# Patient Record
Sex: MALE | Race: WHITE | NOT HISPANIC OR LATINO | Employment: STUDENT | URBAN - METROPOLITAN AREA
[De-identification: names, ages, dates, MRNs, and addresses within clinical notes are randomized per-mention and may not be internally consistent; named-entity substitution may affect disease eponyms.]

---
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
## 2021-01-12 ENCOUNTER — OFFICE VISIT (OUTPATIENT)
Dept: URGENT CARE | Age: 21
End: 2021-01-12
Payer: COMMERCIAL

## 2021-01-12 VITALS — TEMPERATURE: 98.5 F | HEART RATE: 87 BPM | RESPIRATION RATE: 16 BRPM | OXYGEN SATURATION: 98 %

## 2021-01-12 DIAGNOSIS — J02.9 SORE THROAT: Primary | ICD-10-CM

## 2021-01-12 PROCEDURE — S9083 URGENT CARE CENTER GLOBAL: HCPCS | Performed by: PHYSICIAN ASSISTANT

## 2021-01-12 PROCEDURE — 87070 CULTURE OTHR SPECIMN AEROBIC: CPT | Performed by: PHYSICIAN ASSISTANT

## 2021-01-12 PROCEDURE — G0382 LEV 3 HOSP TYPE B ED VISIT: HCPCS | Performed by: PHYSICIAN ASSISTANT

## 2021-01-12 PROCEDURE — 87147 CULTURE TYPE IMMUNOLOGIC: CPT | Performed by: PHYSICIAN ASSISTANT

## 2021-01-13 NOTE — PROGRESS NOTES
[facility]        NAME: Tata Guillen is a 24 y o  male  : 2000    MRN: [id number]  DATE: 2021  TIME: 8:05 PM    Assessment and Plan   Sore throat [J02 9]  1  Sore throat           Patient Instructions       Follow up with PCP in 3-5 days  Proceed to  ER if symptoms worsen  Chief Complaint     Chief Complaint   Patient presents with    Sore Throat     sore throat and possible chills since this morning  History of Present Illness       Patient for evaluation of a sore throat  Patient does have history of strep and went to get checked  Denies any known fevers or chills, body aches, cough, shortness of breath  Review of Systems   Review of Systems   Constitutional: Negative  HENT: Positive for sore throat  Negative for congestion, ear discharge, ear pain, postnasal drip, rhinorrhea, sinus pressure, sinus pain and trouble swallowing  Eyes: Negative  Respiratory: Negative  Cardiovascular: Negative  Gastrointestinal: Negative  Neurological: Negative  Current Medications       Current Outpatient Medications:     ondansetron (ZOFRAN) 4 mg tablet, Take 1 tablet (4 mg total) by mouth every 6 (six) hours (Patient not taking: Reported on 2020), Disp: 12 tablet, Rfl: 0    Current Allergies     Allergies as of 2021    (No Known Allergies)            The following portions of the patient's history were reviewed and updated as appropriate: allergies, current medications, past family history, past medical history, past social history, past surgical history and problem list      Past Medical History:   Diagnosis Date    Viral meningitis        Past Surgical History:   Procedure Laterality Date    EXTERNAL EAR SURGERY      To unattach ear lobes bilaterally       Family History   Problem Relation Age of Onset    Heart disease Maternal Grandfather     Heart attack Maternal Grandfather          Medications have been verified          Objective Pulse 87   Temp 98 5 °F (36 9 °C)   Resp 16   SpO2 98%   No LMP for male patient  Physical Exam     Physical Exam  Vitals signs and nursing note reviewed  Constitutional:       General: He is not in acute distress  Appearance: Normal appearance  He is well-developed  He is not ill-appearing, toxic-appearing or diaphoretic  HENT:      Head: Normocephalic and atraumatic  Mouth/Throat:      Mouth: Mucous membranes are moist       Pharynx: Posterior oropharyngeal erythema present  No pharyngeal swelling, oropharyngeal exudate or uvula swelling  Tonsils: No tonsillar exudate or tonsillar abscesses  0 on the right  0 on the left  Eyes:      Extraocular Movements: Extraocular movements intact  Conjunctiva/sclera: Conjunctivae normal       Pupils: Pupils are equal, round, and reactive to light  Pulmonary:      Effort: Pulmonary effort is normal  No respiratory distress  Breath sounds: Normal breath sounds  No stridor  No wheezing, rhonchi or rales  Skin:     General: Skin is warm and dry  Neurological:      General: No focal deficit present  Mental Status: He is alert and oriented to person, place, and time  Psychiatric:         Mood and Affect: Mood normal          Behavior: Behavior normal          Thought Content:  Thought content normal          Judgment: Judgment normal

## 2021-01-14 ENCOUNTER — TELEMEDICINE (OUTPATIENT)
Dept: INTERNAL MEDICINE CLINIC | Facility: CLINIC | Age: 21
End: 2021-01-14
Payer: COMMERCIAL

## 2021-01-14 VITALS — WEIGHT: 170 LBS | HEIGHT: 73 IN | BODY MASS INDEX: 22.53 KG/M2 | TEMPERATURE: 98.1 F

## 2021-01-14 DIAGNOSIS — J02.9 SORE THROAT: ICD-10-CM

## 2021-01-14 DIAGNOSIS — Z86.16 HISTORY OF COVID-19: ICD-10-CM

## 2021-01-14 DIAGNOSIS — J02.9 SORE THROAT: Primary | ICD-10-CM

## 2021-01-14 PROCEDURE — 99203 OFFICE O/P NEW LOW 30 MIN: CPT | Performed by: INTERNAL MEDICINE

## 2021-01-14 PROCEDURE — 1036F TOBACCO NON-USER: CPT | Performed by: INTERNAL MEDICINE

## 2021-01-14 PROCEDURE — U0005 INFEC AGEN DETEC AMPLI PROBE: HCPCS | Performed by: INTERNAL MEDICINE

## 2021-01-14 PROCEDURE — 3008F BODY MASS INDEX DOCD: CPT | Performed by: INTERNAL MEDICINE

## 2021-01-14 PROCEDURE — U0003 INFECTIOUS AGENT DETECTION BY NUCLEIC ACID (DNA OR RNA); SEVERE ACUTE RESPIRATORY SYNDROME CORONAVIRUS 2 (SARS-COV-2) (CORONAVIRUS DISEASE [COVID-19]), AMPLIFIED PROBE TECHNIQUE, MAKING USE OF HIGH THROUGHPUT TECHNOLOGIES AS DESCRIBED BY CMS-2020-01-R: HCPCS | Performed by: INTERNAL MEDICINE

## 2021-01-14 RX ORDER — AMOXICILLIN 500 MG/1
500 CAPSULE ORAL EVERY 8 HOURS SCHEDULED
Qty: 30 CAPSULE | Refills: 0 | Status: SHIPPED | OUTPATIENT
Start: 2021-01-14 | End: 2021-01-24

## 2021-01-14 NOTE — PATIENT INSTRUCTIONS
differntial dx discussed    Will get repeat COVID test    Throat culture pending    Gargles with salt and water three times a day    Chlorospeptic as needed    Soft diet Advance as tolerated    Keep social distancing at least 6 feet, Drink plenty of fluid, Take temp  Twice a day or more frequently as needed  Use Mask to cover your nose and face when appropriate and possible  Take enough rest   If your breathing gets worse, or persistent high fever or if you get worse go to Emergency room  Ask questionsFollow with Consultants as per their and our suggestion    Follow up in 5  day(s) or as needed earlier    Follow all instructions as advised and discussed  Take your medications as prescribed  Call the office immediately if you experience any side effects  Ask questions if you do not understand  Keep your scheduled appointment as advised or come sooner if necessary or in doubt  Best time to call for non-urgent matter or questions on weekdays is between 9am and 12 noon  See physician for any new symptoms or worsening of current symptoms  Urgent or emergent situations call 911 and report to nearest emergency room  I spent  25 minutes taking care of this patient including clinical care, conseling, collaboration, chart, lab and consultaion review      Patient is to get labs today COVID 19 swab

## 2021-01-14 NOTE — ASSESSMENT & PLAN NOTE
differntial dx discussed    Will get repeat COVID test    Throat culture pending    Gargles with salt and water three times a day    Chlorospeptic as needed    Soft diet Advance as tolerated    Keep social distancing at least 6 feet, Drink plenty of fluid, Take temp  Twice a day or more frequently as needed  Use Mask to cover your nose and face when appropriate and possible  Take enough rest   If your breathing gets worse, or persistent high fever or if you get worse go to Emergency room  Ask questions

## 2021-01-14 NOTE — PROGRESS NOTES
Virtual Regular Visit      Assessment/Plan:sore throat  Hx of recurrent strep  Hx of COVID in 10/2020 recovered without residual  College student  Interested in [employer]  differntial dx discussed    Will get repeat COVID test    Throat culture pending    Gargles with salt and water three times a day    Chlorospeptic as needed    Soft diet Advance as tolerated    Keep social distancing at least 6 feet, Drink plenty of fluid, Take temp  Twice a day or more frequently as needed  Use Mask to cover your nose and face when appropriate and possible  Take enough rest   If your breathing gets worse, or persistent high fever or if you get worse go to Emergency room  Ask questions    Problem List Items Addressed This Visit     None               Reason for visit is   Chief Complaint   Patient presents with    Virtual Regular Visit        Encounter provider Anita Hernandez MD    Provider located at [address] [id number]      Recent Visits  No visits were found meeting these conditions  Showing recent visits within past 7 days and meeting all other requirements     Today's Visits  Date Type Provider Dept   01/14/21 Telemedicine Anita Hernandez MD [facility]   Showing today's visits and meeting all other requirements     Future Appointments  No visits were found meeting these conditions  Showing future appointments within next 150 days and meeting all other requirements        The patient was identified by name and date of birth  Cy Lat was informed that this is a telemedicine visit and that the visit is being conducted through [facility] and patient was informed that this is not a secure, HIPAA-compliant platform  He agrees to proceed     My office door was closed  No one else was in the room  He acknowledged consent and understanding of privacy and security of the video platform   The patient has agreed to participate and understands they can discontinue the visit at any time  Patient is aware this is a billable service  Subjective  Liat England is a 24 y o  male sore throat, interest in plasma donation   Pt c/o sore throat and congestion, pt went to urgent care and had strep screen which was negative  Pt started with sore throat Tuesday  Pt does have recurrent hx of strep throat, confirmed in the past Pt had culture done report pending  Pt c/fo head congestion, tightness in the fore head and runny nose and ear filled with water and feels like popping  No cough or SOB, Wheezing,    No problem with smell or taste, abdominal pain, nausea, or vomiting or diarrhea  No travel hx  No exposure hx for COVID or presumed COVID    Pt goes to college       Pt denies any headache or body ache  PMH negative for asthma or other major health issue    Non smoker, non major drinking           Past Medical History:   Diagnosis Date    GERD (gastroesophageal reflux disease)     Subarachnoid hemorrhage ([facility] [address] ) 3/18/2016    Traumatic brain injury ([facility] [address] ) 3/28/2016    Viral meningitis        Past Surgical History:   Procedure Laterality Date    EXTERNAL EAR SURGERY      To unattach ear lobes bilaterally       No current outpatient medications on file  No current facility-administered medications for this visit  No Known Allergies    Review of Systems   Constitutional: Negative for chills, fatigue, fever and unexpected weight change  HENT: Positive for congestion, ear pain, rhinorrhea, sinus pressure, sneezing and sore throat  Negative for dental problem, drooling, ear discharge, facial swelling, hearing loss, mouth sores, nosebleeds, postnasal drip, sinus pain, tinnitus and trouble swallowing  Eyes: Negative for photophobia, pain, redness and visual disturbance  Respiratory: Negative for cough, shortness of breath and wheezing      Cardiovascular: Negative for chest pain, palpitations and leg swelling  Gastrointestinal: Negative for abdominal pain, diarrhea and nausea  Endocrine: Negative for cold intolerance, polydipsia and polyuria  Genitourinary: Negative for dysuria, frequency and urgency  Musculoskeletal: Negative for arthralgias, gait problem and myalgias  Skin: Negative for rash  Allergic/Immunologic: Negative  Neurological: Negative for dizziness and headaches  Hematological: Negative for adenopathy  Psychiatric/Behavioral: Negative for behavioral problems  Video Exam    Vitals:    01/14/21 1038   Temp: 98 1 °F (36 7 °C)   Weight: 77 1 kg (170 lb)   Height: 6' 1" (1 854 m)       Physical Exam  Constitutional:       General: He is in acute distress  Appearance: He is not ill-appearing or diaphoretic  Pulmonary:      Breath sounds: No wheezing  Psychiatric:         Mood and Affect: Mood normal          Behavior: Behavior normal          Thought Content: Thought content normal          Judgment: Judgment normal           I spent 25 minutes directly with the patient during this visit      VIRTUAL VISIT DISCLAIMER    Liseth Pratt acknowledges that he has consented to an online visit or consultation  He understands that the online visit is based solely on information provided by him, and that, in the absence of a face-to-face physical evaluation by the physician, the diagnosis he receives is both limited and provisional in terms of accuracy and completeness  This is not intended to replace a full medical face-to-face evaluation by the physician  Liseth Pratt understands and accepts these terms

## 2021-01-14 NOTE — ASSESSMENT & PLAN NOTE
Dx in Oct 2020: presented with loss of taste, smell, fatigue, cough, SOB, fatigue, was treated as a outpatient  Pt got better in 8-10 got better  No residual or late effects  [facility]  Pt gave letter from [facility] about probable or confirmed COVID    Recovered till now just got sore throat     See sore throat

## 2021-01-15 LAB
BACTERIA THROAT CULT: ABNORMAL
SARS-COV-2 RNA SPEC QL NAA+PROBE: NOT DETECTED

## 2021-04-13 DIAGNOSIS — Z23 ENCOUNTER FOR IMMUNIZATION: ICD-10-CM

## 2021-05-04 ENCOUNTER — OFFICE VISIT (OUTPATIENT)
Dept: INTERNAL MEDICINE CLINIC | Facility: CLINIC | Age: 21
End: 2021-05-04
Payer: COMMERCIAL

## 2021-05-04 VITALS
WEIGHT: 168 LBS | HEIGHT: 73 IN | HEART RATE: 69 BPM | TEMPERATURE: 98.3 F | BODY MASS INDEX: 22.26 KG/M2 | OXYGEN SATURATION: 96 % | DIASTOLIC BLOOD PRESSURE: 70 MMHG | SYSTOLIC BLOOD PRESSURE: 120 MMHG

## 2021-05-04 DIAGNOSIS — J02.9 PHARYNGITIS, UNSPECIFIED ETIOLOGY: ICD-10-CM

## 2021-05-04 DIAGNOSIS — B37.0 THRUSH: Primary | ICD-10-CM

## 2021-05-04 PROCEDURE — 3725F SCREEN DEPRESSION PERFORMED: CPT | Performed by: INTERNAL MEDICINE

## 2021-05-04 PROCEDURE — 99213 OFFICE O/P EST LOW 20 MIN: CPT | Performed by: INTERNAL MEDICINE

## 2021-05-04 RX ORDER — AMOXICILLIN 500 MG/1
500 CAPSULE ORAL EVERY 8 HOURS SCHEDULED
Qty: 21 CAPSULE | Refills: 0 | Status: SHIPPED | OUTPATIENT
Start: 2021-05-04 | End: 2021-05-11

## 2021-05-04 NOTE — PROGRESS NOTES
[facility] Office Visit Note  21     Adam Alfredo 22 y o  male MRN: [id number]  : 2000    Assessment:     Pharyngitis  Mild acute pharyngitis with some thrush on the tongue  Recommend amoxicillin 500 mg t i d  For 1 week  Recommend nystatin swish and spit 5 mL 3 times a day followed by a warm water and gargles and spit  Six multivitamin for 1 month  If no better come back in 7-10 days  Thrush  Mild acute pharyngitis with some thrush on the tongue  Recommend amoxicillin 500 mg t i d  For 1 week  Recommend nystatin swish and spit 5 mL 3 times a day followed by a warm water and gargles and spit  Six multivitamin for 1 month  If no better come back in 7-10 days  There are no diagnoses linked to this encounter  Discussion Summary and Plan: Today's care plan and medications were reviewed with patient in detail and all their questions answered to their satisfaction  Chief Complaint   Patient presents with    Sore Throat     Red spots and bad taste, back of throat      Subjective:  Chief complaint:  Patient noticed soreness in the back of the throat as well as some white spot on the tongue as well as some red spots of symptoms are there for last 1 month  Patient denies any fever chills denies any swollen gland denies any nausea vomiting diarrhea abdominal pain denies any cough chest congestion denies any stuffy nose runny no sinus congestion denies any headache body ache  No exposure to COVID  No symptoms of the problem with smell or taste  The following portions of the patient's history were reviewed and updated as appropriate: allergies, current medications, past family history, past medical history, past social history, past surgical history and problem list     Review of Systems   Constitutional: Negative for chills, fatigue, fever and unexpected weight change     HENT: Negative for congestion, dental problem, drooling, ear discharge, ear pain, facial swelling, hearing loss, mouth sores, nosebleeds, postnasal drip, rhinorrhea, sinus pressure, sinus pain, sneezing, sore throat, tinnitus, trouble swallowing and voice change  Eyes: Negative for photophobia, pain, discharge, redness, itching and visual disturbance  Respiratory: Negative for cough, shortness of breath, wheezing and stridor  Cardiovascular: Negative for chest pain, palpitations and leg swelling  Gastrointestinal: Negative for abdominal pain, diarrhea and nausea  Endocrine: Negative for cold intolerance, polydipsia and polyuria  Genitourinary: Negative for dysuria, frequency and urgency  Musculoskeletal: Negative for arthralgias, gait problem and myalgias  Skin: Negative for rash  Allergic/Immunologic: Negative  Neurological: Negative for dizziness, seizures, light-headedness, numbness and headaches  Hematological: Negative for adenopathy  Psychiatric/Behavioral: Negative for behavioral problems           Historical Information   Patient Active Problem List   Diagnosis    Subarachnoid hemorrhage (HCC)    [facility] ([email or web address] )    GERD (gastroesophageal reflux disease)    History of COVID-19    Sore throat    Pharyngitis    Thrush     Past Medical History:   Diagnosis Date    GERD (gastroesophageal reflux disease)     Subarachnoid hemorrhage ([place] [address] ) 3/18/2016    Traumatic brain injury ([place] [address] ) 3/28/2016    Viral meningitis      Past Surgical History:   Procedure Laterality Date    EXTERNAL EAR SURGERY      To unattach ear lobes bilaterally     Social History     Substance and Sexual Activity   Alcohol Use Yes    Frequency: 2-4 times a month     Social History     Substance and Sexual Activity   Drug Use No     Social History     Tobacco Use   Smoking Status Never Smoker   Smokeless Tobacco Never Used     Family History   Problem Relation Age of Onset    Heart disease Maternal Grandfather     Heart attack Maternal Grandfather      Health Maintenance Due Topic    DTaP,Tdap,and Td Vaccines (1 - Tdap)    HPV Vaccine (1 - Male 2-dose series)    Depression Screening PHQ     COVID-19 Vaccine (1)    Annual Physical       Meds/Allergies     No current outpatient medications on file  Objective:    Vitals:   /70   Pulse 69   Temp 98 3 °F (36 8 °C)   Ht 6' 1" (1 854 m)   Wt 76 2 kg (168 lb)   SpO2 96%   BMI 22 16 kg/m²   Body mass index is 22 16 kg/m²  Vitals:    05/04/21 1606   Weight: 76 2 kg (168 lb)       Physical Exam  Vitals signs and nursing note reviewed  Constitutional:       Appearance: He is well-developed  HENT:      Head: Normocephalic and atraumatic  Hair is normal       Jaw: There is normal jaw occlusion  Salivary Glands: Right salivary gland is not diffusely enlarged or tender  Left salivary gland is not diffusely enlarged or tender  Mouth/Throat:      Lips: Pink  Mouth: Mucous membranes are moist  No oral lesions or angioedema  Tongue: No lesions  Tongue does not deviate from midline  Pharynx: Posterior oropharyngeal erythema present  No pharyngeal swelling, oropharyngeal exudate or uvula swelling  Tonsils: No tonsillar exudate or tonsillar abscesses  Comments: Mild edema is present in the back of the throat  Mild white is coating in the 2nd posterior half of the tongue is present  Eyes:      Conjunctiva/sclera: Conjunctivae normal    Neck:      Thyroid: No thyromegaly  Vascular: No JVD  Cardiovascular:      Rate and Rhythm: Regular rhythm  Heart sounds: Normal heart sounds  Pulmonary:      Effort: No respiratory distress  Breath sounds: Normal breath sounds  No wheezing or rales  Abdominal:      General: Bowel sounds are normal  There is no distension  Palpations: There is no mass  Tenderness: There is no abdominal tenderness  There is no rebound  Lymphadenopathy:      Cervical: No cervical adenopathy  Skin:     General: Skin is warm  Findings: No rash  Lab Review   No visits with results within 2 Month(s) from this visit  Latest known visit with results is:   Orders Only on 01/14/2021   Component Date Value Ref Range Status    SARS-CoV-2  01/14/2021 Not Detected  Not Detected Final    Comment: This nucleic acid amplification test was developed and its performance  characteristics determined by [facility]  Nucleic acid  amplification tests include PCR and TMA  This test has not been FDA  cleared or approved  This test has been authorized by FDA under an  Emergency Use Authorization (EUA)  This test is only authorized for  the duration of time the declaration that circumstances exist  justifying the authorization of the emergency use of in vitro  diagnostic tests for detection of SARS-CoV-2 virus and/or diagnosis  of COVID-19 infection under section 564(b)(1) of the Act, 21 U  S C   149BVT-9(M) (1), unless the authorization is terminated or revoked  sooner  When diagnostic testing is negative, the possibility of a false  negative result should be considered in the context of a patient's  recent exposures and the presence of clinical signs and symptoms  consistent with COVID-19  An individual without symptoms of COVID-19  and who is not shedding SARS-CoV-2 virus would                            expect to have a  negative (not detected) result in this assay  Patient Instructions   Mild acute pharyngitis with some thrush on the tongue  Recommend amoxicillin 500 mg t i d  For 1 week  Recommend nystatin swish and spit 5 mL 3 times a day followed by a warm water and gargles and spit  Six multivitamin for 1 month  If no better come back in 7-10 days  Dr Araceli Rose MD  [facility]       "This note has been constructed using a voice recognition system  Therefore there may be syntax, spelling, and/or grammatical errors   Please call if you have any questions  "

## 2021-05-04 NOTE — PATIENT INSTRUCTIONS
Mild acute pharyngitis with some thrush on the tongue  Recommend amoxicillin 500 mg t i d  For 1 week  Recommend nystatin swish and spit 5 mL 3 times a day followed by a warm water and gargles and spit  Six multivitamin for 1 month  If no better come back in 7-10 days

## 2021-05-28 ENCOUNTER — TELEMEDICINE (OUTPATIENT)
Dept: INTERNAL MEDICINE CLINIC | Facility: CLINIC | Age: 21
End: 2021-05-28
Payer: COMMERCIAL

## 2021-05-28 VITALS — TEMPERATURE: 98.2 F | WEIGHT: 165 LBS | HEIGHT: 73 IN | BODY MASS INDEX: 21.87 KG/M2

## 2021-05-28 DIAGNOSIS — L23.7 ALLERGIC CONTACT DERMATITIS DUE TO PLANTS, EXCEPT FOOD: Primary | ICD-10-CM

## 2021-05-28 DIAGNOSIS — L23.7 POISON IVY: ICD-10-CM

## 2021-05-28 PROCEDURE — 1036F TOBACCO NON-USER: CPT | Performed by: INTERNAL MEDICINE

## 2021-05-28 PROCEDURE — 3008F BODY MASS INDEX DOCD: CPT | Performed by: INTERNAL MEDICINE

## 2021-05-28 PROCEDURE — 99213 OFFICE O/P EST LOW 20 MIN: CPT | Performed by: INTERNAL MEDICINE

## 2021-05-28 RX ORDER — METHYLPREDNISOLONE 4 MG/1
TABLET ORAL
Qty: 21 EACH | Refills: 0 | Status: SHIPPED | OUTPATIENT
Start: 2021-05-28 | End: 2022-08-01

## 2021-05-28 NOTE — PROGRESS NOTES
Virtual Regular Visit      Assessment/Plan:Contact dermatitis, posion exposure    Problem List Items Addressed This Visit        Musculoskeletal and Integument    Allergic contact dermatitis due to plants, except food - Primary     Medrol Dosepak as directed  Benadryl 25 mg  every 6 hour as needed for the itching  Do not apply any cream on the rash  If no better the call us Tuesday or any time  Relevant Medications    methylPREDNISolone 4 MG tablet therapy pack    Poison ivy    Relevant Medications    methylPREDNISolone 4 MG tablet therapy pack               Reason for visit is   Chief Complaint   Patient presents with    Dermatitis    Rash    Virtual Regular Visit        Encounter provider Tashia Willingham MD    Provider located at [address]      Recent Visits  Date Type Provider Dept   05/28/21 Telemedicine Tashia Willingham MD [address] [facility]   Showing recent visits within past 7 days and meeting all other requirements     Future Appointments  No visits were found meeting these conditions  Showing future appointments within next 150 days and meeting all other requirements        The patient was identified by name and date of birth  Vishnu Francisco was informed that this is a telemedicine visit and that the visit is being conducted through [address] [place] [address] Now and patient was informed that this is a secure, HIPAA-compliant platform  He agrees to proceed     My office door was closed  No one else was in the room  He acknowledged consent and understanding of privacy and security of the video platform  The patient has agreed to participate and understands they can discontinue the visit at any time  Patient is aware this is a billable service  Subjective  Vishnu Francisco is a 22 y o  male rash in the back of left knee after exposure to yd work    He works for the [address] [place] [address]      CC:Rash  Onset acute  Duration : 2 days  Locations: on back of the knee-left   Nature: confluent, blisters, tiny , classic somewhat oozing type  No fever or other systemic sx  Work: works for [employer]         Past Medical History:   Diagnosis Date    GERD (gastroesophageal reflux disease)     [facility] ) 3/18/2016    Tra[facility] injury ([facility] ) 3/28/2016    Viral meningitis        Past Surgical History:   Procedure Laterality Date    EXTERNAL EAR SURGERY      To unattach ear lobes bilaterally       Current Outpatient Medications   Medication Sig Dispense Refill    methylPREDNISolone 4 MG tablet therapy pack Use as directed on package 21 each 0     No current facility-administered medications for this visit  No Known Allergies    Review of Systems   Constitutional: Negative for chills, fatigue, fever and unexpected weight change  HENT: Negative for ear pain, postnasal drip, sinus pain and sore throat  Eyes: Negative for pain and redness  Respiratory: Negative for cough and shortness of breath  Cardiovascular: Negative for chest pain, palpitations and leg swelling  Gastrointestinal: Negative for abdominal pain, diarrhea and nausea  Endocrine: Negative for cold intolerance, polydipsia and polyuria  Genitourinary: Negative for dysuria, frequency and urgency  Musculoskeletal: Negative for arthralgias, gait problem and myalgias  Skin: Positive for rash  Allergic/Immunologic: Negative  Neurological: Negative for dizziness and headaches  Hematological: Negative for adenopathy  Psychiatric/Behavioral: Negative for behavioral problems  Video Exam    Vitals:    05/28/21 1606   Temp: 98 2 °F (36 8 °C)   Weight: 74 8 kg (165 lb)   Height: 6' 1" (1 854 m)       Physical Exam  Constitutional:       Appearance: He is not ill-appearing or diaphoretic     Neurological:      Mental Status: He is oriented to person, place, and time  I spent 20 minutes directly with the patient during this visit      Napoleon ResendizNicolTonny Pratt acknowledges that he has consented to an online visit or consultation  He understands that the online visit is based solely on information provided by him, and that, in the absence of a face-to-face physical evaluation by the physician, the diagnosis he receives is both limited and provisional in terms of accuracy and completeness  This is not intended to replace a full medical face-to-face evaluation by the physician  Patrick Pratt understands and accepts these terms

## 2021-05-28 NOTE — PATIENT INSTRUCTIONS
Medrol Dosepak as directed  Benadryl 25 mg  every 6 hour as needed for the itching  Do not apply any cream on the rash  If no better the call us Tuesday or any time

## 2021-12-20 DIAGNOSIS — R05.9 COUGH: Primary | ICD-10-CM

## 2022-07-27 ENCOUNTER — RA CDI HCC (OUTPATIENT)
Dept: OTHER | Facility: HOSPITAL | Age: 22
End: 2022-07-27

## 2022-07-27 NOTE — PROGRESS NOTES
[facility]       Chart reviewed, no opportunity found: CHART REVIEWED, NO OPPORTUNITY FOUND        Patients Insurance        Commercial Insurance: [address] [place] [address]

## 2022-08-01 ENCOUNTER — OFFICE VISIT (OUTPATIENT)
Dept: INTERNAL MEDICINE CLINIC | Facility: CLINIC | Age: 22
End: 2022-08-01
Payer: COMMERCIAL

## 2022-08-01 VITALS
HEIGHT: 73 IN | WEIGHT: 178 LBS | OXYGEN SATURATION: 99 % | BODY MASS INDEX: 23.59 KG/M2 | HEART RATE: 75 BPM | DIASTOLIC BLOOD PRESSURE: 70 MMHG | SYSTOLIC BLOOD PRESSURE: 120 MMHG

## 2022-08-01 DIAGNOSIS — Z00.00 WELLNESS EXAMINATION: Primary | ICD-10-CM

## 2022-08-01 DIAGNOSIS — R53.82 CHRONIC FATIGUE: ICD-10-CM

## 2022-08-01 DIAGNOSIS — E53.8 VITAMIN B12 DEFICIENCY: ICD-10-CM

## 2022-08-01 PROBLEM — L23.7 POISON IVY: Status: RESOLVED | Noted: 2021-05-28 | Resolved: 2022-08-01

## 2022-08-01 PROCEDURE — 3725F SCREEN DEPRESSION PERFORMED: CPT | Performed by: INTERNAL MEDICINE

## 2022-08-01 PROCEDURE — 99395 PREV VISIT EST AGE 18-39: CPT | Performed by: INTERNAL MEDICINE

## 2022-08-01 NOTE — PATIENT INSTRUCTIONS
Stay active  Find time for yourself yourself a break periodically  continue with healthy living with exercise how time out for yourself    Get the blood test for evaluation of fatigue as outlined    Call me 3-7 days after the blood test at [phone]

## 2022-08-01 NOTE — PROGRESS NOTES
Tashia Willingham Office Visit Note  22     Vishnu Francisco 25 y o  male MRN: [id number]  : 2000    Assessment:     1  Wellness examination  Assessment & Plan:  Generally a fairly healthy gentleman  Will check sugar hemoglobin      2  Chronic fatigue  Assessment & Plan:  Fatigue  Will differential diagnosis discussed with the patient and family  Will check CBCs CMP TSH and vitamin B12  Likely related to studding  Lifestyle adjustments reviewed    Orders:  -     CBC; Future; Expected date: 2022  -     Comprehensive metabolic panel; Future; Expected date: 2022  -     TSH, 3rd generation; Future; Expected date: 2022  -     Vitamin B12; Future; Expected date: 2022    3  Vitamin B12 deficiency  -     Vitamin B12; Future; Expected date: 2022          Discussion Summary and Plan: Today's care plan and medications were reviewed with patient in detail and all their questions answered to their satisfaction  Chief Complaint   Patient presents with    Physical Exam      Subjective:  Jeanne Fernandez is a young healthy  50-year-old here for the physical examination for annual wellness  He is in last year of college  Complains of some chronic fatigue going on for last few months to a year  Usual sleep requirement is 11:00 p m  to 7 a m       Lately studying a lot more  Does not smoke does not drink no drugs  Done on point review of system is otherwise negative  The following portions of the patient's history were reviewed and updated as appropriate: allergies, current medications, past family history, past medical history, past social history, past surgical history and problem list     Review of Systems   Constitutional: Positive for fatigue  Negative for chills, fever and unexpected weight change  HENT: Negative for ear pain, postnasal drip, sinus pain and sore throat  Eyes: Negative for pain and redness  Respiratory: Negative for cough and shortness of breath  Cardiovascular: Negative for chest pain, palpitations and leg swelling  Gastrointestinal: Negative for abdominal pain, diarrhea and nausea  Endocrine: Negative for cold intolerance, polydipsia and polyuria  Genitourinary: Negative for dysuria, frequency and urgency  Musculoskeletal: Negative for arthralgias, gait problem and myalgias  Skin: Negative for rash  Allergic/Immunologic: Negative  Neurological: Negative  Negative for dizziness and headaches  Hematological: Negative for adenopathy  Psychiatric/Behavioral: Negative  Negative for behavioral problems  Historical Information   Patient Active Problem List   Diagnosis    Subarachnoid hemorrhage (HCC)    Traumatic brain injury ([place] [address] )    GERD (gastroesophageal reflux disease)    History of COVID-19    Allergic contact dermatitis due to plants, except food    Wellness examination    Chronic fatigue     Past Medical History:   Diagnosis Date    GERD (gastroesophageal reflux disease)     Subarachnoid hemorrhage ([place] [address] ) 3/18/2016    Traumatic brain injury ([place] [address] ) 3/28/2016    Viral meningitis      Past Surgical History:   Procedure Laterality Date    EXTERNAL EAR SURGERY      To unattach ear lobes bilaterally     Social History     Substance and Sexual Activity   Alcohol Use Yes     Social History     Substance and Sexual Activity   Drug Use No     Social History     Tobacco Use   Smoking Status Never Smoker   Smokeless Tobacco Never Used     Family History   Problem Relation Age of Onset    Heart disease Maternal Grandfather     Heart attack Maternal Grandfather      Health Maintenance Due   Topic    Hepatitis C Screening     HPV Vaccine (1 - Risk male 3-dose series)    HIV Screening     BMI: Adult     COVID-19 Vaccine (2 - Pfizer series)    DTaP,Tdap,and Td Vaccines (1 - Tdap)    Influenza Vaccine (1)      Meds/Allergies     No current outpatient medications on file        Objective:    Vitals:   /70   Pulse 75 Ht 6' 1" (1 854 m)   Wt 80 7 kg (178 lb)   SpO2 99%   BMI 23 48 kg/m²   Body mass index is 23 48 kg/m²  Vitals:    08/01/22 1252   Weight: 80 7 kg (178 lb)       Physical Exam  Constitutional:       General: He is not in acute distress  Appearance: He is well-developed  He is not ill-appearing or diaphoretic  HENT:      Head: Normocephalic and atraumatic  Right Ear: Tympanic membrane, ear canal and external ear normal       Left Ear: Tympanic membrane, ear canal and external ear normal       Mouth/Throat:      Lips: No lesions  Dentition: No gum lesions  Tongue: No lesions  Pharynx: No oropharyngeal exudate or posterior oropharyngeal erythema  Tonsils: No tonsillar exudate  Eyes:      General: Lids are normal          Right eye: No discharge  Left eye: No discharge  Conjunctiva/sclera: Conjunctivae normal    Neck:      Thyroid: No thyroid mass or thyromegaly  Vascular: No carotid bruit or JVD  Trachea: Trachea normal    Cardiovascular:      Rate and Rhythm: Regular rhythm  Heart sounds: Normal heart sounds  Pulmonary:      Effort: No respiratory distress  Breath sounds: Normal breath sounds  No wheezing or rales  Abdominal:      General: Bowel sounds are normal  There is no distension  Palpations: There is no mass  Tenderness: There is no rebound  Musculoskeletal:      Right lower leg: No edema  Left lower leg: No edema  Lymphadenopathy:      Cervical: No cervical adenopathy  Skin:     General: Skin is warm  Coloration: Skin is not jaundiced or pale  Findings: No rash  Neurological:      Mental Status: He is alert  Coordination: Coordination normal    Psychiatric:         Behavior: Behavior normal          Judgment: Judgment normal          Lab Review   No visits with results within 2 Month(s) from this visit     Latest known visit with results is:   Orders Only on 01/14/2021   Component Date Value Ref Range Status    SARS-CoV-2  01/14/2021 Not Detected  Not Detected Final    Comment: This nucleic acid amplification test was developed and its performance  characteristics determined by [facility]  Nucleic acid  amplification tests include PCR and TMA  This test has not been FDA  cleared or approved  This test has been authorized by FDA under an  Emergency Use Authorization (EUA)  This test is only authorized for  the duration of time the declaration that circumstances exist  justifying the authorization of the emergency use of in vitro  diagnostic tests for detection of SARS-CoV-2 virus and/or diagnosis  of COVID-19 infection under section 564(b)(1) of the Act, 21 U  S C   437TSB-9(M) (1), unless the authorization is terminated or revoked  sooner  When diagnostic testing is negative, the possibility of a false  negative result should be considered in the context of a patient's  recent exposures and the presence of clinical signs and symptoms  consistent with COVID-19  An individual without symptoms of COVID-19  and who is not shedding SARS-CoV-2 virus would                            expect to have a  negative (not detected) result in this assay  Patient Instructions   Stay active  Find time for yourself yourself a break periodically  continue with healthy living with exercise how time out for yourself    Get the blood test for evaluation of fatigue as outlined  Call me 3-7 days after the blood test at [phone]       Dr Corine Stanton MD  [facility]       "This note has been constructed using a voice recognition system  Therefore there may be syntax, spelling, and/or grammatical errors   Please call if you have any questions  "

## 2022-08-01 NOTE — ASSESSMENT & PLAN NOTE
Fatigue  Will differential diagnosis discussed with the patient and family  Will check CBCs CMP TSH and vitamin B12  Likely related to studding    Lifestyle adjustments reviewed

## 2022-08-02 ENCOUNTER — APPOINTMENT (OUTPATIENT)
Dept: LAB | Facility: CLINIC | Age: 22
End: 2022-08-02
Payer: COMMERCIAL

## 2022-08-02 DIAGNOSIS — R53.82 CHRONIC FATIGUE: ICD-10-CM

## 2022-08-02 DIAGNOSIS — E53.8 VITAMIN B12 DEFICIENCY: ICD-10-CM

## 2022-08-02 LAB
ALBUMIN SERPL BCP-MCNC: 4.3 G/DL (ref 3.5–5)
ALP SERPL-CCNC: 63 U/L (ref 34–104)
ALT SERPL W P-5'-P-CCNC: 11 U/L (ref 7–52)
ANION GAP SERPL CALCULATED.3IONS-SCNC: 5 MMOL/L (ref 4–13)
AST SERPL W P-5'-P-CCNC: 13 U/L (ref 13–39)
BILIRUB SERPL-MCNC: 0.5 MG/DL (ref 0.2–1)
BUN SERPL-MCNC: 13 MG/DL (ref 5–25)
CALCIUM SERPL-MCNC: 9.4 MG/DL (ref 8.4–10.2)
CHLORIDE SERPL-SCNC: 106 MMOL/L (ref 96–108)
CO2 SERPL-SCNC: 28 MMOL/L (ref 21–32)
CREAT SERPL-MCNC: 0.84 MG/DL (ref 0.6–1.3)
ERYTHROCYTE [DISTWIDTH] IN BLOOD BY AUTOMATED COUNT: 12 % (ref 11.6–15.1)
GFR SERPL CREATININE-BSD FRML MDRD: 125 ML/MIN/1.73SQ M
GLUCOSE P FAST SERPL-MCNC: 89 MG/DL (ref 65–99)
HCT VFR BLD AUTO: 46.1 % (ref 36.5–49.3)
HGB BLD-MCNC: 16 G/DL (ref 12–17)
MCH RBC QN AUTO: 29.6 PG (ref 26.8–34.3)
MCHC RBC AUTO-ENTMCNC: 34.7 G/DL (ref 31.4–37.4)
MCV RBC AUTO: 85 FL (ref 82–98)
PLATELET # BLD AUTO: 301 THOUSANDS/UL (ref 149–390)
PMV BLD AUTO: 10.7 FL (ref 8.9–12.7)
POTASSIUM SERPL-SCNC: 4 MMOL/L (ref 3.5–5.3)
PROT SERPL-MCNC: 7.1 G/DL (ref 6.4–8.4)
RBC # BLD AUTO: 5.4 MILLION/UL (ref 3.88–5.62)
SODIUM SERPL-SCNC: 139 MMOL/L (ref 135–147)
TSH SERPL DL<=0.05 MIU/L-ACNC: 2.97 UIU/ML (ref 0.45–4.5)
VIT B12 SERPL-MCNC: 576 PG/ML (ref 100–900)
WBC # BLD AUTO: 5.76 THOUSAND/UL (ref 4.31–10.16)

## 2022-08-02 PROCEDURE — 80053 COMPREHEN METABOLIC PANEL: CPT

## 2022-08-02 PROCEDURE — 36415 COLL VENOUS BLD VENIPUNCTURE: CPT

## 2022-08-02 PROCEDURE — 82607 VITAMIN B-12: CPT

## 2022-08-02 PROCEDURE — 84443 ASSAY THYROID STIM HORMONE: CPT

## 2022-08-02 PROCEDURE — 85027 COMPLETE CBC AUTOMATED: CPT

## 2023-07-12 ENCOUNTER — RA CDI HCC (OUTPATIENT)
Dept: OTHER | Facility: HOSPITAL | Age: 23
End: 2023-07-12

## 2023-07-12 NOTE — PROGRESS NOTES
[address] [facility] coding opportunities       Chart reviewed, no opportunity found: CHART REVIEWED, NO OPPORTUNITY FOUND        Patients Insurance        Commercial Insurance: Josep Mao